# Patient Record
Sex: FEMALE | URBAN - METROPOLITAN AREA
[De-identification: names, ages, dates, MRNs, and addresses within clinical notes are randomized per-mention and may not be internally consistent; named-entity substitution may affect disease eponyms.]

---

## 2020-05-19 ENCOUNTER — APPOINTMENT (OUTPATIENT)
Age: 40
Setting detail: DERMATOLOGY
End: 2020-05-19

## 2020-05-19 DIAGNOSIS — L40.0 PSORIASIS VULGARIS: ICD-10-CM

## 2020-05-19 DIAGNOSIS — L29.8 OTHER PRURITUS: ICD-10-CM

## 2020-05-19 PROBLEM — L30.9 DERMATITIS, UNSPECIFIED: Status: ACTIVE | Noted: 2020-05-19

## 2020-05-19 PROCEDURE — OTHER PRESCRIPTION: OTHER

## 2020-05-19 PROCEDURE — OTHER BIOPSY BY PUNCH METHOD: OTHER

## 2020-05-19 PROCEDURE — OTHER MIPS QUALITY: OTHER

## 2020-05-19 PROCEDURE — 11104 PUNCH BX SKIN SINGLE LESION: CPT

## 2020-05-19 PROCEDURE — 99202 OFFICE O/P NEW SF 15 MIN: CPT | Mod: 25

## 2020-05-19 PROCEDURE — 11105 PUNCH BX SKIN EA SEP/ADDL: CPT

## 2020-05-19 PROCEDURE — OTHER COUNSELING: OTHER

## 2020-05-19 RX ORDER — CLOBETASOL PROPIONATE 0.5 MG/ML
0.05% SOLUTION TOPICAL QD
Qty: 1 | Refills: 3 | Status: ERX | COMMUNITY
Start: 2020-05-19

## 2020-05-19 RX ORDER — CLOBETASOL PROPIONATE 0.5 MG/G
0.05 CREAM TOPICAL BID
Qty: 1 | Refills: 2 | Status: ERX

## 2020-05-19 RX ORDER — KETOCONAZOLE 20 MG/ML
2% SHAMPOO TOPICAL QOD
Qty: 2 | Refills: 3 | Status: ERX | COMMUNITY
Start: 2020-05-19

## 2020-05-19 ASSESSMENT — LOCATION DETAILED DESCRIPTION DERM
LOCATION DETAILED: RIGHT SUPERIOR MEDIAL UPPER BACK
LOCATION DETAILED: RIGHT POSTERIOR JAWLINE

## 2020-05-19 ASSESSMENT — LOCATION SIMPLE DESCRIPTION DERM
LOCATION SIMPLE: RIGHT UPPER BACK
LOCATION SIMPLE: RIGHT JAWLINE

## 2020-05-19 ASSESSMENT — LOCATION ZONE DERM
LOCATION ZONE: TRUNK
LOCATION ZONE: FACE

## 2020-05-19 NOTE — PROCEDURE: BIOPSY BY PUNCH METHOD
Validate Anticipated Plan: No
Notification Instructions: Patient will be notified of biopsy results. However, patient instructed to call the office if not contacted within 2 weeks.
Billing Type: Third-Party Bill
X Depth Of Punch In Cm (Optional): 0
Consent: Written consent was obtained and risks were reviewed including but not limited to scarring, infection, bleeding, scabbing, incomplete removal, nerve damage and allergy to anesthesia.
Suture Removal: 14 days
Punch Size In Mm: 3
Dressing: bandage
Information: Selecting Yes will display possible errors in your note based on the variables you have selected. This validation is only offered as a suggestion for you. PLEASE NOTE THAT THE VALIDATION TEXT WILL BE REMOVED WHEN YOU FINALIZE YOUR NOTE. IF YOU WANT TO FAX A PRELIMINARY NOTE YOU WILL NEED TO TOGGLE THIS TO 'NO' IF YOU DO NOT WANT IT IN YOUR FAXED NOTE.
Biopsy Type: H and E
Validate Note Data (See Information Below): Yes
Epidermal Sutures: 3-0 Ethilon
Wound Care: Petrolatum
Anesthesia Volume In Cc (Will Not Render If 0): 0.5
Hemostasis: None
Post-Care Instructions: I reviewed with the patient in detail post-care instructions. Patient is to keep the biopsy site dry overnight, and then apply bacitracin twice daily until healed. Patient may apply hydrogen peroxide soaks to remove any crusting.
Anesthesia Type: 1% lidocaine with epinephrine
Home Suture Removal Text: Patient was provided a home suture removal kit and will remove their sutures at home.  If they have any questions or difficulties they will call the office.
Detail Level: Detailed

## 2020-06-02 ENCOUNTER — APPOINTMENT (OUTPATIENT)
Age: 40
Setting detail: DERMATOLOGY
End: 2020-06-02

## 2020-06-02 DIAGNOSIS — L40.0 PSORIASIS VULGARIS: ICD-10-CM

## 2020-06-02 DIAGNOSIS — L30.4 ERYTHEMA INTERTRIGO: ICD-10-CM

## 2020-06-02 DIAGNOSIS — Z48.02 ENCOUNTER FOR REMOVAL OF SUTURES: ICD-10-CM

## 2020-06-02 PROBLEM — L30.9 DERMATITIS, UNSPECIFIED: Status: ACTIVE | Noted: 2020-06-02

## 2020-06-02 PROCEDURE — OTHER PRESCRIPTION: OTHER

## 2020-06-02 PROCEDURE — 99213 OFFICE O/P EST LOW 20 MIN: CPT

## 2020-06-02 PROCEDURE — OTHER MIPS QUALITY: OTHER

## 2020-06-02 PROCEDURE — OTHER COUNSELING: OTHER

## 2020-06-02 PROCEDURE — OTHER SUTURE REMOVAL (GLOBAL PERIOD): OTHER

## 2020-06-02 RX ORDER — CALCIPOTRIENE 0.05 MG/G
0.005% CREAM TOPICAL BID
Qty: 1 | Refills: 1 | Status: ERX

## 2020-06-02 RX ORDER — NYSTATIN 100000 [USP'U]/G
CREAM TOPICAL BID
Qty: 1 | Refills: 1 | Status: ERX

## 2020-06-02 ASSESSMENT — LOCATION SIMPLE DESCRIPTION DERM
LOCATION SIMPLE: LEFT BREAST
LOCATION SIMPLE: UPPER BACK
LOCATION SIMPLE: SCALP
LOCATION SIMPLE: RIGHT BREAST

## 2020-06-02 ASSESSMENT — LOCATION DETAILED DESCRIPTION DERM
LOCATION DETAILED: RIGHT INFRAMAMMARY CREASE (INNER QUADRANT)
LOCATION DETAILED: SUPERIOR THORACIC SPINE
LOCATION DETAILED: RIGHT INFERIOR POSTAURICULAR SKIN
LOCATION DETAILED: LEFT MEDIAL BREAST 7-8:00 REGION

## 2020-06-02 ASSESSMENT — LOCATION ZONE DERM
LOCATION ZONE: SCALP
LOCATION ZONE: TRUNK

## 2020-06-02 NOTE — PROCEDURE: SUTURE REMOVAL (GLOBAL PERIOD)
Detail Level: Detailed
Add 29574 Cpt? (Important Note: In 2017 The Use Of 24683 Is Being Tracked By Cms To Determine Future Global Period Reimbursement For Global Periods): no

## 2020-07-01 ENCOUNTER — RX ONLY (RX ONLY)
Age: 40
End: 2020-07-01

## 2020-07-01 RX ORDER — CALCITRIOL 3 UG/G
3 OINTMENT TOPICAL QDAY
Qty: 1 | Refills: 3 | Status: ERX

## 2020-07-02 ENCOUNTER — APPOINTMENT (OUTPATIENT)
Age: 40
Setting detail: DERMATOLOGY
End: 2020-07-03

## 2020-07-02 DIAGNOSIS — L40.0 PSORIASIS VULGARIS: ICD-10-CM

## 2020-07-02 PROCEDURE — OTHER COUNSELING: OTHER

## 2020-07-02 PROCEDURE — OTHER ADDITIONAL NOTES: OTHER

## 2020-07-02 PROCEDURE — 99213 OFFICE O/P EST LOW 20 MIN: CPT

## 2020-07-02 PROCEDURE — OTHER MIPS QUALITY: OTHER

## 2020-07-02 PROCEDURE — OTHER PRESCRIPTION MEDICATION MANAGEMENT: OTHER

## 2020-07-02 NOTE — PROCEDURE: PRESCRIPTION MEDICATION MANAGEMENT
Plan: If patient does not have calcipotriene, she was instructed to  the Vectical at the pharmacy.
Detail Level: Zone
Render In Strict Bullet Format?: No
Continue Regimen: Clobetasol

## 2020-07-02 NOTE — PROCEDURE: ADDITIONAL NOTES
Detail Level: Simple
Additional Notes: Pt is on lithium and PCP thinks it flaired psoriasis. PCP is taking patient off lithium to see if that will help.

## 2020-08-13 ENCOUNTER — APPOINTMENT (OUTPATIENT)
Age: 40
Setting detail: DERMATOLOGY
End: 2020-08-14

## 2020-08-13 DIAGNOSIS — L40.0 PSORIASIS VULGARIS: ICD-10-CM

## 2020-08-13 DIAGNOSIS — L30.4 ERYTHEMA INTERTRIGO: ICD-10-CM

## 2020-08-13 PROBLEM — L30.9 DERMATITIS, UNSPECIFIED: Status: ACTIVE | Noted: 2020-08-13

## 2020-08-13 PROCEDURE — OTHER BIOPSY BY PUNCH METHOD: OTHER

## 2020-08-13 PROCEDURE — OTHER PRESCRIPTION MEDICATION MANAGEMENT: OTHER

## 2020-08-13 PROCEDURE — OTHER MIPS QUALITY: OTHER

## 2020-08-13 PROCEDURE — OTHER COUNSELING: OTHER

## 2020-08-13 PROCEDURE — 99213 OFFICE O/P EST LOW 20 MIN: CPT | Mod: 25

## 2020-08-13 PROCEDURE — 11104 PUNCH BX SKIN SINGLE LESION: CPT

## 2020-08-13 PROCEDURE — 11105 PUNCH BX SKIN EA SEP/ADDL: CPT

## 2020-08-13 ASSESSMENT — LOCATION ZONE DERM
LOCATION ZONE: LEG
LOCATION ZONE: TRUNK

## 2020-08-13 ASSESSMENT — LOCATION DETAILED DESCRIPTION DERM
LOCATION DETAILED: LEFT MEDIAL BREAST 7-8:00 REGION
LOCATION DETAILED: RIGHT PROXIMAL PRETIBIAL REGION
LOCATION DETAILED: LEFT PROXIMAL PRETIBIAL REGION
LOCATION DETAILED: RIGHT INFRAMAMMARY CREASE (INNER QUADRANT)

## 2020-08-13 ASSESSMENT — LOCATION SIMPLE DESCRIPTION DERM
LOCATION SIMPLE: LEFT PRETIBIAL REGION
LOCATION SIMPLE: RIGHT BREAST
LOCATION SIMPLE: LEFT BREAST
LOCATION SIMPLE: RIGHT PRETIBIAL REGION

## 2020-08-13 NOTE — PROCEDURE: BIOPSY BY PUNCH METHOD
Information: Selecting Yes will display possible errors in your note based on the variables you have selected. This validation is only offered as a suggestion for you. PLEASE NOTE THAT THE VALIDATION TEXT WILL BE REMOVED WHEN YOU FINALIZE YOUR NOTE. IF YOU WANT TO FAX A PRELIMINARY NOTE YOU WILL NEED TO TOGGLE THIS TO 'NO' IF YOU DO NOT WANT IT IN YOUR FAXED NOTE.
Size Of Lesion In Cm (Optional): 0
Render Post-Care Instructions In Note?: no
Notification Instructions: Patient will be notified of biopsy results. However, patient instructed to call the office if not contacted within 2 weeks.
Punch Size In Mm: 3
Wound Care: Petrolatum
Hemostasis: None
Dressing: bandage
Billing Type: Third-Party Bill
Was A Bandage Applied: Yes
Biopsy Type: H and E
Post-Care Instructions: I reviewed with the patient in detail post-care instructions. Patient is to keep the biopsy site dry overnight, and then apply bacitracin twice daily until healed. Patient may apply hydrogen peroxide soaks to remove any crusting.
Anesthesia Volume In Cc (Will Not Render If 0): 0.5
Suture Removal: 14 days
Home Suture Removal Text: Patient was provided a home suture removal kit and will remove their sutures at home.  If they have any questions or difficulties they will call the office.
Consent: Written consent was obtained and risks were reviewed including but not limited to scarring, infection, bleeding, scabbing, incomplete removal, nerve damage and allergy to anesthesia.
Anesthesia Type: 1% lidocaine with epinephrine
Epidermal Sutures: 3-0 Ethilon
Detail Level: Detailed

## 2020-08-13 NOTE — PROCEDURE: PRESCRIPTION MEDICATION MANAGEMENT
Render In Strict Bullet Format?: No
Detail Level: Zone
Continue Regimen: Clobetasol and vectical as needed

## 2020-08-26 ENCOUNTER — RX ONLY (RX ONLY)
Age: 40
End: 2020-08-26

## 2020-08-26 RX ORDER — CLOBETASOL PROPIONATE 0.5 MG/G
0.05 CREAM TOPICAL BID
Qty: 1 | Refills: 2 | Status: ERX

## 2020-08-28 ENCOUNTER — APPOINTMENT (OUTPATIENT)
Age: 40
Setting detail: DERMATOLOGY
End: 2020-08-28

## 2020-08-28 DIAGNOSIS — Z48.02 ENCOUNTER FOR REMOVAL OF SUTURES: ICD-10-CM

## 2020-08-28 DIAGNOSIS — L40.0 PSORIASIS VULGARIS: ICD-10-CM

## 2020-08-28 PROCEDURE — OTHER COUNSELING: OTHER

## 2020-08-28 PROCEDURE — 99024 POSTOP FOLLOW-UP VISIT: CPT

## 2020-08-28 PROCEDURE — OTHER SUTURE REMOVAL (GLOBAL PERIOD): OTHER

## 2020-08-28 PROCEDURE — OTHER ADDITIONAL NOTES: OTHER

## 2020-08-28 PROCEDURE — OTHER PRESCRIPTION MEDICATION MANAGEMENT: OTHER

## 2020-08-28 ASSESSMENT — LOCATION SIMPLE DESCRIPTION DERM
LOCATION SIMPLE: LEFT BREAST
LOCATION SIMPLE: LEFT PRETIBIAL REGION
LOCATION SIMPLE: RIGHT PRETIBIAL REGION
LOCATION SIMPLE: RIGHT BREAST

## 2020-08-28 ASSESSMENT — LOCATION ZONE DERM
LOCATION ZONE: LEG
LOCATION ZONE: TRUNK

## 2020-08-28 ASSESSMENT — LOCATION DETAILED DESCRIPTION DERM
LOCATION DETAILED: LEFT PROXIMAL PRETIBIAL REGION
LOCATION DETAILED: LEFT INFRAMAMMARY CREASE (INNER QUADRANT)
LOCATION DETAILED: RIGHT MEDIAL BREAST 5-6:00 REGION
LOCATION DETAILED: RIGHT PROXIMAL PRETIBIAL REGION

## 2020-08-28 NOTE — PROCEDURE: PRESCRIPTION MEDICATION MANAGEMENT
Modify Regimen: Vectical M-F\\nClobetasol sat-sun\\nNystatin TID
Detail Level: Zone
Render In Strict Bullet Format?: No

## 2020-08-28 NOTE — PROCEDURE: ADDITIONAL NOTES
Detail Level: Simple
Additional Notes: Referral to rheumatology to rule out PsA
Additional Notes: Denies pregnancy or breast feeding

## 2020-08-28 NOTE — PROCEDURE: SUTURE REMOVAL (GLOBAL PERIOD)
Detail Level: Detailed
Add 53960 Cpt? (Important Note: In 2017 The Use Of 02024 Is Being Tracked By Cms To Determine Future Global Period Reimbursement For Global Periods): no

## 2020-09-28 ENCOUNTER — RX ONLY (RX ONLY)
Age: 40
End: 2020-09-28

## 2020-09-28 RX ORDER — CLOBETASOL PROPIONATE 0.5 MG/G
0.05 CREAM TOPICAL BID
Qty: 1 | Refills: 2 | Status: ERX

## 2020-10-13 ENCOUNTER — APPOINTMENT (OUTPATIENT)
Age: 40
Setting detail: DERMATOLOGY
End: 2020-10-13

## 2020-10-13 VITALS — WEIGHT: 210 LBS | HEIGHT: 62 IN

## 2020-10-13 DIAGNOSIS — L40.0 PSORIASIS VULGARIS: ICD-10-CM

## 2020-10-13 DIAGNOSIS — L29.8 OTHER PRURITUS: ICD-10-CM

## 2020-10-13 PROCEDURE — 99213 OFFICE O/P EST LOW 20 MIN: CPT

## 2020-10-13 PROCEDURE — OTHER MIPS QUALITY: OTHER

## 2020-10-13 PROCEDURE — OTHER ADDITIONAL NOTES: OTHER

## 2020-10-13 PROCEDURE — OTHER ORDER TESTS: OTHER

## 2020-10-13 PROCEDURE — OTHER COUNSELING: OTHER

## 2020-10-13 ASSESSMENT — LOCATION SIMPLE DESCRIPTION DERM
LOCATION SIMPLE: LEFT PRETIBIAL REGION
LOCATION SIMPLE: RIGHT PRETIBIAL REGION

## 2020-10-13 ASSESSMENT — LOCATION DETAILED DESCRIPTION DERM
LOCATION DETAILED: LEFT PROXIMAL PRETIBIAL REGION
LOCATION DETAILED: RIGHT PROXIMAL PRETIBIAL REGION

## 2020-10-13 ASSESSMENT — LOCATION ZONE DERM: LOCATION ZONE: LEG

## 2020-10-13 NOTE — PROCEDURE: ADDITIONAL NOTES
Detail Level: Simple
Additional Notes: Pt had hep panel done 2 weeks ago and hep B and C were negative. A biologic medication is preferred but do to patients insurance, a trial of methotrexate is necessary.  She will discuss the risk and benefits of methotrexate with her  and call if she decides to start.  Denies hx of cancer. Patient denies pregnancy or breastfeeding.  She may continue topical creams.

## 2020-10-15 ENCOUNTER — RX ONLY (RX ONLY)
Age: 40
End: 2020-10-15

## 2020-10-15 RX ORDER — NYSTATIN 100000 [USP'U]/G
1 CREAM TOPICAL BID
Qty: 1 | Refills: 1 | Status: ERX

## 2020-11-17 ENCOUNTER — RX ONLY (RX ONLY)
Age: 40
End: 2020-11-17

## 2020-11-17 RX ORDER — NYSTATIN 100000 [USP'U]/G
1 CREAM TOPICAL BID
Qty: 1 | Refills: 1 | Status: ERX

## 2020-12-22 ENCOUNTER — APPOINTMENT (OUTPATIENT)
Age: 40
Setting detail: DERMATOLOGY
End: 2020-12-22

## 2020-12-22 DIAGNOSIS — L40.0 PSORIASIS VULGARIS: ICD-10-CM

## 2020-12-22 PROCEDURE — OTHER ADDITIONAL NOTES: OTHER

## 2020-12-22 PROCEDURE — OTHER PRESCRIPTION: OTHER

## 2020-12-22 PROCEDURE — OTHER COUNSELING: OTHER

## 2020-12-22 PROCEDURE — 99213 OFFICE O/P EST LOW 20 MIN: CPT

## 2020-12-22 PROCEDURE — OTHER MIPS QUALITY: OTHER

## 2020-12-22 RX ORDER — METHOTREXATE SODIUM 2.5 MG/1
1 TABLET ORAL QWEEK
Qty: 24 | Refills: 1 | Status: ERX

## 2020-12-22 RX ORDER — FOLIC ACID 1 MG/1
1 TABLET ORAL QD
Qty: 26 | Refills: 2 | Status: ERX

## 2020-12-22 ASSESSMENT — LOCATION ZONE DERM: LOCATION ZONE: LEG

## 2020-12-22 ASSESSMENT — LOCATION SIMPLE DESCRIPTION DERM
LOCATION SIMPLE: LEFT PRETIBIAL REGION
LOCATION SIMPLE: RIGHT PRETIBIAL REGION

## 2020-12-22 NOTE — PROCEDURE: ADDITIONAL NOTES
Additional Notes: Patient talked with her  and she would like to start methotrexate. Pt states she is hep B and C negative.
Detail Level: Simple

## 2021-01-29 ENCOUNTER — APPOINTMENT (OUTPATIENT)
Age: 41
Setting detail: DERMATOLOGY
End: 2021-01-29

## 2021-01-29 DIAGNOSIS — L40.0 PSORIASIS VULGARIS: ICD-10-CM

## 2021-01-29 PROCEDURE — OTHER ADDITIONAL NOTES: OTHER

## 2021-01-29 PROCEDURE — OTHER ORDER TESTS: OTHER

## 2021-01-29 PROCEDURE — 99213 OFFICE O/P EST LOW 20 MIN: CPT

## 2021-01-29 PROCEDURE — OTHER MIPS QUALITY: OTHER

## 2021-01-29 PROCEDURE — OTHER COUNSELING: OTHER

## 2021-01-29 ASSESSMENT — LOCATION SIMPLE DESCRIPTION DERM
LOCATION SIMPLE: RIGHT PRETIBIAL REGION
LOCATION SIMPLE: LEFT PRETIBIAL REGION

## 2021-01-29 ASSESSMENT — LOCATION ZONE DERM: LOCATION ZONE: LEG

## 2021-01-29 NOTE — PROCEDURE: ADDITIONAL NOTES
Additional Notes: Pt reports per PCP stopped methotrexate due to bad side effects and increased lft’s. Hepatitis panels negative ordered by rheumatologist 9/2020 B and C negative. Will get CBC, CMP and lipids. Paperwork started for Humira.
Detail Level: Simple
Render Risk Assessment In Note?: no

## 2021-03-01 ENCOUNTER — RX ONLY (RX ONLY)
Age: 41
End: 2021-03-01

## 2021-03-01 RX ORDER — CLOBETASOL PROPIONATE 0.5 MG/G
0.05 CREAM TOPICAL BID
Qty: 1 | Refills: 2 | Status: ERX

## 2021-06-01 ENCOUNTER — APPOINTMENT (OUTPATIENT)
Age: 41
Setting detail: DERMATOLOGY
End: 2021-06-01

## 2021-06-01 DIAGNOSIS — L40.0 PSORIASIS VULGARIS: ICD-10-CM

## 2021-06-01 PROCEDURE — OTHER COUNSELING: OTHER

## 2021-06-01 PROCEDURE — OTHER ADDITIONAL NOTES: OTHER

## 2021-06-01 PROCEDURE — OTHER MIPS QUALITY: OTHER

## 2021-06-01 PROCEDURE — 99212 OFFICE O/P EST SF 10 MIN: CPT

## 2021-06-01 ASSESSMENT — LOCATION ZONE DERM: LOCATION ZONE: LEG

## 2021-06-01 ASSESSMENT — LOCATION SIMPLE DESCRIPTION DERM
LOCATION SIMPLE: LEFT PRETIBIAL REGION
LOCATION SIMPLE: RIGHT PRETIBIAL REGION

## 2021-06-01 NOTE — PROCEDURE: ADDITIONAL NOTES
Detail Level: Simple
Render Risk Assessment In Note?: no
Additional Notes: Had nursing call patient insurance, she has now been approved for Humira for one year. Told patient to wait for a phone call from pharmacy to see where she wants a prescription sent. Told patient to have it sent here first so we can instruct on how to do injections. Told patient if she has not heard back from pharmacy within one week to call us and let us know. Patient denies pregnancy breast-feeding. Patient verbally agreed and had no other questions at this time

## 2021-07-13 ENCOUNTER — APPOINTMENT (OUTPATIENT)
Age: 41
Setting detail: DERMATOLOGY
End: 2021-07-13

## 2021-07-13 DIAGNOSIS — L40.0 PSORIASIS VULGARIS: ICD-10-CM

## 2021-07-13 PROCEDURE — OTHER ADDITIONAL NOTES: OTHER

## 2021-07-13 PROCEDURE — OTHER MIPS QUALITY: OTHER

## 2021-07-13 PROCEDURE — OTHER COUNSELING: OTHER

## 2021-07-13 PROCEDURE — 99213 OFFICE O/P EST LOW 20 MIN: CPT

## 2021-07-13 PROCEDURE — OTHER PRESCRIPTION: OTHER

## 2021-07-13 RX ORDER — ADALIMUMAB 40MG/0.8ML
1 KIT SUBCUTANEOUS Q2WEEKS
Qty: 1 | Refills: 6 | Status: ERX

## 2021-07-13 ASSESSMENT — LOCATION SIMPLE DESCRIPTION DERM
LOCATION SIMPLE: RIGHT PRETIBIAL REGION
LOCATION SIMPLE: LEFT PRETIBIAL REGION

## 2021-07-13 ASSESSMENT — LOCATION ZONE DERM: LOCATION ZONE: LEG

## 2021-07-13 NOTE — PROCEDURE: ADDITIONAL NOTES
Additional Notes: Patient still has not received Humira. Pt is calling her insurance and pharmacy to get things situated with her member ID per pt
Detail Level: Simple
Render Risk Assessment In Note?: no

## 2021-08-10 ENCOUNTER — APPOINTMENT (OUTPATIENT)
Age: 41
Setting detail: DERMATOLOGY
End: 2021-08-10

## 2021-08-10 DIAGNOSIS — L40.0 PSORIASIS VULGARIS: ICD-10-CM

## 2021-08-10 PROCEDURE — OTHER ADDITIONAL NOTES: OTHER

## 2021-08-10 PROCEDURE — OTHER SEPARATE AND IDENTIFIABLE DOCUMENTATION: OTHER

## 2021-08-10 PROCEDURE — 96372 THER/PROPH/DIAG INJ SC/IM: CPT

## 2021-08-10 PROCEDURE — OTHER COUNSELING: OTHER

## 2021-08-10 PROCEDURE — OTHER MIPS QUALITY: OTHER

## 2021-08-10 PROCEDURE — OTHER HUMIRA INJECTION: OTHER

## 2021-08-10 PROCEDURE — 99212 OFFICE O/P EST SF 10 MIN: CPT | Mod: 25

## 2021-08-10 ASSESSMENT — LOCATION SIMPLE DESCRIPTION DERM
LOCATION SIMPLE: ABDOMEN
LOCATION SIMPLE: RIGHT PRETIBIAL REGION
LOCATION SIMPLE: LEFT PRETIBIAL REGION

## 2021-08-10 ASSESSMENT — LOCATION ZONE DERM
LOCATION ZONE: TRUNK
LOCATION ZONE: LEG

## 2021-08-10 ASSESSMENT — LOCATION DETAILED DESCRIPTION DERM
LOCATION DETAILED: LEFT PROXIMAL PRETIBIAL REGION
LOCATION DETAILED: RIGHT PROXIMAL PRETIBIAL REGION
LOCATION DETAILED: PERIUMBILICAL SKIN

## 2021-08-10 NOTE — PROCEDURE: ADDITIONAL NOTES
Render Risk Assessment In Note?: no
Detail Level: Simple
Additional Notes: Patient denies pregnancy or breastfeeding. Patient wishes to give herself maintenance injections at home. Injection training provided.

## 2021-09-23 ENCOUNTER — RX ONLY (RX ONLY)
Age: 41
End: 2021-09-23

## 2021-09-23 RX ORDER — CLOBETASOL PROPIONATE 0.5 MG/G
CREAM TOPICAL
Qty: 60 | Refills: 1 | Status: ERX

## 2021-11-09 ENCOUNTER — APPOINTMENT (OUTPATIENT)
Age: 41
Setting detail: DERMATOLOGY
End: 2021-11-09

## 2021-11-09 VITALS — WEIGHT: 193 LBS | HEIGHT: 63 IN

## 2021-11-09 DIAGNOSIS — L40.0 PSORIASIS VULGARIS: ICD-10-CM

## 2021-11-09 DIAGNOSIS — L30.4 ERYTHEMA INTERTRIGO: ICD-10-CM

## 2021-11-09 PROCEDURE — OTHER PRESCRIPTION: OTHER

## 2021-11-09 PROCEDURE — OTHER ADDITIONAL NOTES: OTHER

## 2021-11-09 PROCEDURE — 99213 OFFICE O/P EST LOW 20 MIN: CPT

## 2021-11-09 PROCEDURE — OTHER MIPS QUALITY: OTHER

## 2021-11-09 PROCEDURE — OTHER COUNSELING: OTHER

## 2021-11-09 RX ORDER — CLOBETASOL PROPIONATE 0.05 MG/G
1 GEL TOPICAL BID
Qty: 60 | Refills: 5 | Status: ERX

## 2021-11-09 RX ORDER — NYSTATIN 100000 [USP'U]/G
1 CREAM TOPICAL QD
Qty: 30 | Refills: 3 | Status: ERX | COMMUNITY
Start: 2021-11-09

## 2021-11-09 RX ORDER — FEXOFENADINE 180 MG/1
1 TABLET, FILM COATED ORAL QD
Qty: 30 | Refills: 6 | Status: ERX | COMMUNITY
Start: 2021-11-09

## 2021-11-09 ASSESSMENT — LOCATION DETAILED DESCRIPTION DERM
LOCATION DETAILED: RIGHT DISTAL DORSAL FOREARM
LOCATION DETAILED: RIGHT RIB CAGE
LOCATION DETAILED: SUBXIPHOID
LOCATION DETAILED: LEFT RIB CAGE

## 2021-11-09 ASSESSMENT — LOCATION SIMPLE DESCRIPTION DERM
LOCATION SIMPLE: RIGHT FOREARM
LOCATION SIMPLE: ABDOMEN

## 2021-11-09 ASSESSMENT — LOCATION ZONE DERM
LOCATION ZONE: TRUNK
LOCATION ZONE: ARM

## 2021-11-09 NOTE — PROCEDURE: ADDITIONAL NOTES
Render Risk Assessment In Note?: no
Detail Level: Simple
Additional Notes: Patient has been on Humira for 12 weeks now with minimal results will start PA process for taltz

## 2021-11-09 NOTE — PROCEDURE: MIPS QUALITY
Quality 110: Preventive Care And Screening: Influenza Immunization: Influenza Immunization not Administered for Documented Reasons.
Quality 130: Documentation Of Current Medications In The Medical Record: Current Medications Documented
Detail Level: Detailed
Quality 226: Preventive Care And Screening: Tobacco Use: Screening And Cessation Intervention: Patient screened for tobacco use and is an ex/non-smoker

## 2021-12-21 ENCOUNTER — APPOINTMENT (OUTPATIENT)
Age: 41
Setting detail: DERMATOLOGY
End: 2021-12-21

## 2021-12-21 DIAGNOSIS — L30.4 ERYTHEMA INTERTRIGO: ICD-10-CM

## 2021-12-21 DIAGNOSIS — L40.0 PSORIASIS VULGARIS: ICD-10-CM

## 2021-12-21 PROCEDURE — OTHER COUNSELING: OTHER

## 2021-12-21 PROCEDURE — OTHER ADDITIONAL NOTES: OTHER

## 2021-12-21 ASSESSMENT — LOCATION SIMPLE DESCRIPTION DERM
LOCATION SIMPLE: ABDOMEN
LOCATION SIMPLE: RIGHT FOREARM

## 2021-12-21 ASSESSMENT — LOCATION DETAILED DESCRIPTION DERM
LOCATION DETAILED: RIGHT RIB CAGE
LOCATION DETAILED: RIGHT DISTAL DORSAL FOREARM
LOCATION DETAILED: LEFT RIB CAGE
LOCATION DETAILED: SUBXIPHOID

## 2021-12-21 ASSESSMENT — LOCATION ZONE DERM
LOCATION ZONE: ARM
LOCATION ZONE: TRUNK

## 2022-02-09 ENCOUNTER — APPOINTMENT (OUTPATIENT)
Age: 42
Setting detail: DERMATOLOGY
End: 2022-02-09

## 2022-02-09 ENCOUNTER — APPOINTMENT (OUTPATIENT)
Age: 42
Setting detail: DERMATOLOGY
End: 2022-02-15

## 2022-02-09 ENCOUNTER — RX ONLY (RX ONLY)
Age: 42
End: 2022-02-09

## 2022-02-09 DIAGNOSIS — L40.0 PSORIASIS VULGARIS: ICD-10-CM

## 2022-02-09 PROCEDURE — OTHER ORDER TESTS: OTHER

## 2022-02-09 PROCEDURE — OTHER COUNSELING: OTHER

## 2022-02-09 RX ORDER — IXEKIZUMAB 80 MG/ML
1 INJECTION, SOLUTION SUBCUTANEOUS
Qty: 1 | Refills: 6 | Status: ERX

## 2022-02-09 RX ORDER — IXEKIZUMAB 80 MG/ML
1 INJECTION, SOLUTION SUBCUTANEOUS EVERY 2 WEEKS
Qty: 1 | Refills: 1 | Status: ERX

## 2022-02-09 RX ORDER — IXEKIZUMAB 80 MG/ML
1 INJECTION, SOLUTION SUBCUTANEOUS ONCE
Qty: 1 | Refills: 0 | Status: ERX

## 2022-07-19 ENCOUNTER — APPOINTMENT (OUTPATIENT)
Age: 42
Setting detail: DERMATOLOGY
End: 2022-07-19

## 2022-07-19 DIAGNOSIS — L40.0 PSORIASIS VULGARIS: ICD-10-CM

## 2022-07-19 PROCEDURE — OTHER ORDER TESTS: OTHER

## 2022-07-19 PROCEDURE — OTHER PRESCRIPTION: OTHER

## 2022-07-19 PROCEDURE — 99214 OFFICE O/P EST MOD 30 MIN: CPT

## 2022-07-19 PROCEDURE — OTHER MIPS QUALITY: OTHER

## 2022-07-19 PROCEDURE — OTHER COUNSELING: OTHER

## 2022-07-19 PROCEDURE — OTHER ADDITIONAL NOTES: OTHER

## 2022-07-19 RX ORDER — IXEKIZUMAB 80 MG/ML
INJECTION, SOLUTION SUBCUTANEOUS
Qty: 2 | Refills: 5 | Status: ERX

## 2022-07-19 ASSESSMENT — LOCATION ZONE DERM
LOCATION ZONE: TRUNK
LOCATION ZONE: ARM

## 2022-07-19 ASSESSMENT — LOCATION SIMPLE DESCRIPTION DERM
LOCATION SIMPLE: RIGHT FOREARM
LOCATION SIMPLE: ABDOMEN

## 2022-07-19 ASSESSMENT — LOCATION DETAILED DESCRIPTION DERM
LOCATION DETAILED: RIGHT RIB CAGE
LOCATION DETAILED: RIGHT DISTAL DORSAL FOREARM
LOCATION DETAILED: LEFT RIB CAGE
